# Patient Record
Sex: FEMALE | Race: OTHER | HISPANIC OR LATINO | Employment: UNEMPLOYED | URBAN - METROPOLITAN AREA
[De-identification: names, ages, dates, MRNs, and addresses within clinical notes are randomized per-mention and may not be internally consistent; named-entity substitution may affect disease eponyms.]

---

## 2021-07-25 ENCOUNTER — HOSPITAL ENCOUNTER (EMERGENCY)
Facility: HOSPITAL | Age: 46
Discharge: HOME | End: 2021-07-25
Attending: EMERGENCY MEDICINE | Admitting: EMERGENCY MEDICINE
Payer: COMMERCIAL

## 2021-07-25 ENCOUNTER — APPOINTMENT (EMERGENCY)
Dept: RADIOLOGY | Facility: HOSPITAL | Age: 46
End: 2021-07-25
Payer: COMMERCIAL

## 2021-07-25 VITALS
OXYGEN SATURATION: 100 % | SYSTOLIC BLOOD PRESSURE: 113 MMHG | WEIGHT: 140.65 LBS | TEMPERATURE: 98.1 F | RESPIRATION RATE: 16 BRPM | DIASTOLIC BLOOD PRESSURE: 75 MMHG | HEART RATE: 60 BPM

## 2021-07-25 DIAGNOSIS — U07.1 LAB TEST POSITIVE FOR DETECTION OF COVID-19 VIRUS: ICD-10-CM

## 2021-07-25 DIAGNOSIS — N93.8 DYSFUNCTIONAL UTERINE BLEEDING: Primary | ICD-10-CM

## 2021-07-25 DIAGNOSIS — D64.9 ANEMIA: ICD-10-CM

## 2021-07-25 LAB
ABO GROUP BLD: NORMAL
ABO GROUP BLD: NORMAL
ANION GAP SERPL CALCULATED.3IONS-SCNC: 9 MMOL/L (ref 4–13)
ANISOCYTOSIS BLD QL SMEAR: PRESENT
BACTERIA UR QL AUTO: ABNORMAL /HPF
BASOPHILS # BLD MANUAL: 0 THOUSAND/UL (ref 0–0.1)
BASOPHILS NFR MAR MANUAL: 0 % (ref 0–1)
BILIRUB UR QL STRIP: NEGATIVE
BLD GP AB SCN SERPL QL: NEGATIVE
BUN SERPL-MCNC: 10 MG/DL (ref 5–25)
CALCIUM SERPL-MCNC: 8.1 MG/DL (ref 8.3–10.1)
CHLORIDE SERPL-SCNC: 103 MMOL/L (ref 100–108)
CLARITY UR: CLEAR
CO2 SERPL-SCNC: 26 MMOL/L (ref 21–32)
COLOR UR: ABNORMAL
CREAT SERPL-MCNC: 0.82 MG/DL (ref 0.6–1.3)
EOSINOPHIL # BLD MANUAL: 0.17 THOUSAND/UL (ref 0–0.4)
EOSINOPHIL NFR BLD MANUAL: 4 % (ref 0–6)
ERYTHROCYTE [DISTWIDTH] IN BLOOD BY AUTOMATED COUNT: 20.5 % (ref 11.6–15.1)
EXT PREG TEST URINE: NORMAL
EXT. CONTROL ED NAV: NORMAL
GFR SERPL CREATININE-BSD FRML MDRD: 86 ML/MIN/1.73SQ M
GLUCOSE SERPL-MCNC: 86 MG/DL (ref 65–140)
GLUCOSE UR STRIP-MCNC: NEGATIVE MG/DL
HCT VFR BLD AUTO: 25.1 % (ref 34.8–46.1)
HGB BLD-MCNC: 6.9 G/DL (ref 11.5–15.4)
HGB UR QL STRIP.AUTO: ABNORMAL
HYPERCHROMIA BLD QL SMEAR: PRESENT
KETONES UR STRIP-MCNC: NEGATIVE MG/DL
LEUKOCYTE ESTERASE UR QL STRIP: NEGATIVE
LYMPHOCYTES # BLD AUTO: 1.1 THOUSAND/UL (ref 0.6–4.47)
LYMPHOCYTES # BLD AUTO: 26 % (ref 14–44)
MCH RBC QN AUTO: 17.4 PG (ref 26.8–34.3)
MCHC RBC AUTO-ENTMCNC: 27.5 G/DL (ref 31.4–37.4)
MCV RBC AUTO: 63 FL (ref 82–98)
MONOCYTES # BLD AUTO: 0.21 THOUSAND/UL (ref 0–1.22)
MONOCYTES NFR BLD: 5 % (ref 4–12)
NEUTROPHILS # BLD MANUAL: 2.76 THOUSAND/UL (ref 1.85–7.62)
NEUTS BAND NFR BLD MANUAL: 2 % (ref 0–8)
NEUTS SEG NFR BLD AUTO: 63 % (ref 43–75)
NITRITE UR QL STRIP: NEGATIVE
NON-SQ EPI CELLS URNS QL MICRO: ABNORMAL /HPF
NRBC BLD AUTO-RTO: 0 /100 WBCS
PH UR STRIP.AUTO: 5.5 [PH] (ref 4.5–8)
PLATELET # BLD AUTO: 319 THOUSANDS/UL (ref 149–390)
PLATELET BLD QL SMEAR: ADEQUATE
PMV BLD AUTO: 10.6 FL (ref 8.9–12.7)
POTASSIUM SERPL-SCNC: 4.6 MMOL/L (ref 3.5–5.3)
PROT UR STRIP-MCNC: NEGATIVE MG/DL
RBC # BLD AUTO: 3.97 MILLION/UL (ref 3.81–5.12)
RBC #/AREA URNS AUTO: ABNORMAL /HPF
RH BLD: POSITIVE
RH BLD: POSITIVE
SARS-COV-2 RNA RESP QL NAA+PROBE: POSITIVE
SODIUM SERPL-SCNC: 138 MMOL/L (ref 136–145)
SP GR UR STRIP.AUTO: >=1.03 (ref 1–1.03)
SPECIMEN EXPIRATION DATE: NORMAL
TOTAL CELLS COUNTED SPEC: 100
TSH SERPL DL<=0.05 MIU/L-ACNC: 1.22 UIU/ML (ref 0.36–3.74)
UROBILINOGEN UR QL STRIP.AUTO: 0.2 E.U./DL
WBC # BLD AUTO: 4.24 THOUSAND/UL (ref 4.31–10.16)
WBC #/AREA URNS AUTO: ABNORMAL /HPF

## 2021-07-25 PROCEDURE — 96375 TX/PRO/DX INJ NEW DRUG ADDON: CPT

## 2021-07-25 PROCEDURE — 99285 EMERGENCY DEPT VISIT HI MDM: CPT

## 2021-07-25 PROCEDURE — 96361 HYDRATE IV INFUSION ADD-ON: CPT

## 2021-07-25 PROCEDURE — 81025 URINE PREGNANCY TEST: CPT | Performed by: EMERGENCY MEDICINE

## 2021-07-25 PROCEDURE — U0005 INFEC AGEN DETEC AMPLI PROBE: HCPCS | Performed by: EMERGENCY MEDICINE

## 2021-07-25 PROCEDURE — 36430 TRANSFUSION BLD/BLD COMPNT: CPT

## 2021-07-25 PROCEDURE — 86850 RBC ANTIBODY SCREEN: CPT | Performed by: EMERGENCY MEDICINE

## 2021-07-25 PROCEDURE — 84443 ASSAY THYROID STIM HORMONE: CPT | Performed by: EMERGENCY MEDICINE

## 2021-07-25 PROCEDURE — 85025 COMPLETE CBC W/AUTO DIFF WBC: CPT | Performed by: EMERGENCY MEDICINE

## 2021-07-25 PROCEDURE — 85007 BL SMEAR W/DIFF WBC COUNT: CPT | Performed by: EMERGENCY MEDICINE

## 2021-07-25 PROCEDURE — U0003 INFECTIOUS AGENT DETECTION BY NUCLEIC ACID (DNA OR RNA); SEVERE ACUTE RESPIRATORY SYNDROME CORONAVIRUS 2 (SARS-COV-2) (CORONAVIRUS DISEASE [COVID-19]), AMPLIFIED PROBE TECHNIQUE, MAKING USE OF HIGH THROUGHPUT TECHNOLOGIES AS DESCRIBED BY CMS-2020-01-R: HCPCS | Performed by: EMERGENCY MEDICINE

## 2021-07-25 PROCEDURE — 86920 COMPATIBILITY TEST SPIN: CPT

## 2021-07-25 PROCEDURE — 86901 BLOOD TYPING SEROLOGIC RH(D): CPT | Performed by: EMERGENCY MEDICINE

## 2021-07-25 PROCEDURE — 99285 EMERGENCY DEPT VISIT HI MDM: CPT | Performed by: EMERGENCY MEDICINE

## 2021-07-25 PROCEDURE — 36415 COLL VENOUS BLD VENIPUNCTURE: CPT | Performed by: EMERGENCY MEDICINE

## 2021-07-25 PROCEDURE — 80048 BASIC METABOLIC PNL TOTAL CA: CPT | Performed by: EMERGENCY MEDICINE

## 2021-07-25 PROCEDURE — 85027 COMPLETE CBC AUTOMATED: CPT | Performed by: EMERGENCY MEDICINE

## 2021-07-25 PROCEDURE — 86900 BLOOD TYPING SEROLOGIC ABO: CPT | Performed by: EMERGENCY MEDICINE

## 2021-07-25 PROCEDURE — P9016 RBC LEUKOCYTES REDUCED: HCPCS

## 2021-07-25 PROCEDURE — 71045 X-RAY EXAM CHEST 1 VIEW: CPT

## 2021-07-25 PROCEDURE — 96374 THER/PROPH/DIAG INJ IV PUSH: CPT

## 2021-07-25 PROCEDURE — 81001 URINALYSIS AUTO W/SCOPE: CPT

## 2021-07-25 RX ORDER — METOCLOPRAMIDE HYDROCHLORIDE 5 MG/ML
10 INJECTION INTRAMUSCULAR; INTRAVENOUS ONCE
Status: COMPLETED | OUTPATIENT
Start: 2021-07-25 | End: 2021-07-25

## 2021-07-25 RX ORDER — KETOROLAC TROMETHAMINE 30 MG/ML
15 INJECTION, SOLUTION INTRAMUSCULAR; INTRAVENOUS ONCE
Status: COMPLETED | OUTPATIENT
Start: 2021-07-25 | End: 2021-07-25

## 2021-07-25 RX ORDER — MEDROXYPROGESTERONE ACETATE 5 MG/1
10 TABLET ORAL DAILY
Qty: 18 TABLET | Refills: 0 | Status: SHIPPED | OUTPATIENT
Start: 2021-07-26 | End: 2021-08-04

## 2021-07-25 RX ORDER — MEDROXYPROGESTERONE ACETATE 5 MG/1
10 TABLET ORAL ONCE
Status: COMPLETED | OUTPATIENT
Start: 2021-07-25 | End: 2021-07-25

## 2021-07-25 RX ADMIN — MEDROXYPROGESTERONE ACETATE 10 MG: 5 TABLET ORAL at 18:41

## 2021-07-25 RX ADMIN — KETOROLAC TROMETHAMINE 15 MG: 30 INJECTION, SOLUTION INTRAMUSCULAR; INTRAVENOUS at 17:31

## 2021-07-25 RX ADMIN — SODIUM CHLORIDE 1000 ML: 0.9 INJECTION, SOLUTION INTRAVENOUS at 17:32

## 2021-07-25 RX ADMIN — METOCLOPRAMIDE 10 MG: 5 INJECTION, SOLUTION INTRAMUSCULAR; INTRAVENOUS at 17:31

## 2021-07-25 NOTE — ED PROVIDER NOTES
History  Chief Complaint   Patient presents with    Weakness - Generalized     Generalized weakness, decreased appetite since monday  also reports dizziness  55 y o  F w/h/o anemia p/w cough x 6 days  Dry cough  Also having chills, fatigue, decreased appetite, lightheadedness, and frontal HA  Pt has been doing a lot of traveling and around a lot of people  History provided by:  Patient   used: Yes (Family)    Fatigue  Duration:  6 days  Progression:  Unchanged  Chronicity:  New  Relieved by:  None tried  Worsened by:  Nothing  Ineffective treatments:  None tried  Associated symptoms: cough and headaches    Associated symptoms: no abdominal pain, no diarrhea, no dysuria, no fever, no frequency, no myalgias, no nausea and no vomiting        None       Past Medical History:   Diagnosis Date    Anemia        History reviewed  No pertinent surgical history  History reviewed  No pertinent family history  I have reviewed and agree with the history as documented  E-Cigarette/Vaping     E-Cigarette/Vaping Substances     Social History     Tobacco Use    Smoking status: Never Smoker    Smokeless tobacco: Never Used   Substance Use Topics    Alcohol use: Never    Drug use: Never       Review of Systems   Constitutional: Positive for appetite change, chills and fatigue  Negative for fever  HENT: Negative for rhinorrhea and sore throat  Respiratory: Positive for cough  Gastrointestinal: Negative for abdominal pain, diarrhea, nausea and vomiting  Genitourinary: Negative for dysuria and frequency  Musculoskeletal: Negative for myalgias  Neurological: Positive for light-headedness and headaches  All other systems reviewed and are negative  Physical Exam  Physical Exam  Vitals and nursing note reviewed  Constitutional:       General: She is not in acute distress  Appearance: She is well-developed  She is not ill-appearing, toxic-appearing or diaphoretic  HENT:      Head: Normocephalic and atraumatic  Right Ear: Tympanic membrane normal       Left Ear: Tympanic membrane normal    Eyes:      Extraocular Movements: Extraocular movements intact  Conjunctiva/sclera: Conjunctivae normal       Pupils: Pupils are equal, round, and reactive to light  Neck:      Meningeal: Brudzinski's sign and Kernig's sign absent  Cardiovascular:      Rate and Rhythm: Normal rate and regular rhythm  Heart sounds: Normal heart sounds  No murmur heard  No friction rub  Pulmonary:      Effort: Pulmonary effort is normal  No accessory muscle usage or respiratory distress  Breath sounds: Normal breath sounds  No stridor  No wheezing, rhonchi or rales  Abdominal:      General: There is no distension  Palpations: Abdomen is soft  Abdomen is not rigid  Tenderness: There is no abdominal tenderness  There is no guarding or rebound  Musculoskeletal:      Cervical back: Full passive range of motion without pain, normal range of motion and neck supple  No rigidity  Lymphadenopathy:      Cervical: No cervical adenopathy  Skin:     General: Skin is warm and dry  Coloration: Skin is not pale  Findings: No ecchymosis, petechiae or rash  Neurological:      Mental Status: She is alert  GCS: GCS eye subscore is 4  GCS verbal subscore is 5  GCS motor subscore is 6  Cranial Nerves: No cranial nerve deficit  Sensory: No sensory deficit  Motor: Motor function is intact        Gait: Gait normal    Psychiatric:         Behavior: Behavior normal          Vital Signs  ED Triage Vitals   Temperature Pulse Respirations Blood Pressure SpO2   07/25/21 1551 07/25/21 1551 07/25/21 1551 07/25/21 1551 07/25/21 1551   98 °F (36 7 °C) 80 16 108/71 100 %      Temp Source Heart Rate Source Patient Position - Orthostatic VS BP Location FiO2 (%)   07/25/21 1551 07/25/21 1551 07/25/21 1551 07/25/21 1551 --   Oral Monitor Sitting Right arm       Pain Score 07/25/21 1731       No Pain           Vitals:    07/25/21 2025 07/25/21 2100 07/25/21 2130 07/25/21 2208   BP: 112/74 117/74 123/76 113/75   Pulse: 73 66 68 60   Patient Position - Orthostatic VS:  Lying Lying          Visual Acuity  Visual Acuity      Most Recent Value   L Pupil Size (mm)  3   R Pupil Size (mm)  3          ED Medications  Medications   sodium chloride 0 9 % bolus 1,000 mL (0 mL Intravenous Stopped 7/25/21 1922)   metoclopramide (REGLAN) injection 10 mg (10 mg Intravenous Given 7/25/21 1731)   ketorolac (TORADOL) injection 15 mg (15 mg Intravenous Given 7/25/21 1731)   medroxyPROGESTERone (PROVERA) tablet 10 mg (10 mg Oral Given 7/25/21 1841)       Diagnostic Studies  Results Reviewed     Procedure Component Value Units Date/Time    Novel Coronavirus (Covid-19),PCR [facility] - 24 Hour Routine [[id number]]  (Abnormal) Collected: 07/25/21 1726    Lab Status: Final result Specimen: Nares from Nose Updated: 07/25/21 1817     SARS-CoV-2 Positive    Narrative: The specimen collection materials, transport medium, and/or testing methodology utilized in the production of these test results have been proven to be reliable in a limited validation with an abbreviated program under the Emergency Utilization Authorization provided by the FDA  Testing reported as "Presumptive positive" will be confirmed with secondary testing to ensure result accuracy  Clinical caution and judgement should be used with the interpretation of these results with consideration of the clinical impression and other laboratory testing  Testing reported as "Positive" or "Negative" has been proven to be accurate according to standard laboratory validation requirements  All testing is performed with control materials showing appropriate reactivity at standard intervals        Manual Differential(PHLEBS Do Not Order) [[id number]] Collected: 07/25/21 1731    Lab Status: Final result Specimen: Blood from Arm, Right Updated: 07/25/21 1806     Segmented % 63 %      Bands % 2 %      Lymphocytes % 26 %      Monocytes % 5 %      Eosinophils, % 4 %      Basophils % 0 %      Absolute Neutrophils 2 76 Thousand/uL      Lymphocytes Absolute 1 10 Thousand/uL      Monocytes Absolute 0 21 Thousand/uL      Eosinophils Absolute 0 17 Thousand/uL      Basophils Absolute 0 00 Thousand/uL      Total Counted 100     Anisocytosis Present     Hypochromia Present     Platelet Estimate Adequate    Narrative:      WBC has been corrected due to the presence of NRBC, please see adjusted WBC     Basic metabolic panel [[id number]]  (Abnormal) Collected: 07/25/21 1731    Lab Status: Final result Specimen: Blood from Arm, Right Updated: 07/25/21 1805     Sodium 138 mmol/L      Potassium 4 6 mmol/L      Chloride 103 mmol/L      CO2 26 mmol/L      ANION GAP 9 mmol/L      BUN 10 mg/dL      Creatinine 0 82 mg/dL      Glucose 86 mg/dL      Calcium 8 1 mg/dL      eGFR 86 ml/min/1 73sq m     Narrative:      [facility] guidelines for Chronic Kidney Disease (CKD):     Stage 1 with normal or high GFR (GFR > 90 mL/min/1 73 square meters)    Stage 2 Mild CKD (GFR = 60-89 mL/min/1 73 square meters)    Stage 3A Moderate CKD (GFR = 45-59 mL/min/1 73 square meters)    Stage 3B Moderate CKD (GFR = 30-44 mL/min/1 73 square meters)    Stage 4 Severe CKD (GFR = 15-29 mL/min/1 73 square meters)    Stage 5 End Stage CKD (GFR <15 mL/min/1 73 square meters)  Note: GFR calculation is accurate only with a steady state creatinine    TSH [[id number]]  (Normal) Collected: 07/25/21 1731    Lab Status: Final result Specimen: Blood from Arm, Right Updated: 07/25/21 1805     TSH 3RD GENERATON 1 215 uIU/mL     Narrative:      Patients undergoing fluorescein dye angiography may retain small amounts of fluorescein in the body for 48-72 hours post procedure  Samples containing fluorescein can produce falsely depressed TSH values   If the patient had this procedure,a specimen should be resubmitted post fluorescein clearance  Urine Microscopic [[id number]]  (Abnormal) Collected: 07/25/21 1729    Lab Status: Final result Specimen: Urine, Clean Catch Updated: 07/25/21 1801     RBC, UA 10-20 /hpf      WBC, UA None Seen /hpf      Epithelial Cells Occasional /hpf      Bacteria, UA Occasional /hpf     CBC and differential [[id number]]  (Abnormal) Collected: 07/25/21 1731    Lab Status: Final result Specimen: Blood from Arm, Right Updated: 07/25/21 1747     WBC 4 24 Thousand/uL      RBC 3 97 Million/uL      Hemoglobin 6 9 g/dL      Hematocrit 25 1 %      MCV 63 fL      MCH 17 4 pg      MCHC 27 5 g/dL      RDW 20 5 %      MPV 10 6 fL      Platelets 663 Thousands/uL      nRBC 0 /100 WBCs     Narrative: This is an appended report  These results have been appended to a previously verified report      POCT pregnancy, urine [[id number]]  (Normal) Resulted: 07/25/21 1736    Lab Status: Final result Updated: 07/25/21 1736     EXT PREG TEST UR (Ref: Negative) NEG(-)     Control VALID    Urine Macroscopic, POC [[id number]]  (Abnormal) Collected: 07/25/21 1729    Lab Status: Final result Specimen: Urine Updated: 07/25/21 1730     Color, UA Orange     Clarity, UA Clear     pH, UA 5 5     Leukocytes, UA Negative     Nitrite, UA Negative     Protein, UA Negative mg/dl      Glucose, UA Negative mg/dl      Ketones, UA Negative mg/dl      Urobilinogen, UA 0 2 E U /dl      Bilirubin, UA Negative     Blood, UA Moderate     Specific Gravity, UA >=1 030    Narrative:      CLINITEK RESULT                 XR chest portable   ED Interpretation by DO Calvin (07/25 1726)   No acute abnormalities                 Procedures  CriticalCare Time  Performed by: DO Calvin  Authorized by: DO Calvin     Critical care provider statement:     Critical care time (minutes):  45    Critical care time was exclusive of:  Separately billable procedures and treating other patients and teaching time    Critical care was necessary to treat or prevent imminent or life-threatening deterioration of the following conditions:  Circulatory failure    Critical care was time spent personally by me on the following activities:  Blood draw for specimens, obtaining history from patient or surrogate, development of treatment plan with patient or surrogate, evaluation of patient's response to treatment, examination of patient, ordering and performing treatments and interventions, ordering and review of laboratory studies, ordering and review of radiographic studies and re-evaluation of patient's condition    I assumed direction of critical care for this patient from another provider in my specialty: no    Comments:      Pt with anemia requiring transfusion             ED Course  ED Course as of Jul 25 2237   Sun Jul 25, 2021   1731 Pt given Toradol/Reglan      1746 Informed pt of low Hg  Pt states she has a h/o low hg but has never had a transfusion  She states she believes it's from heavy periods for which she has f/u with OBGYN in [place] 2 days for  Pt started her period yesterday  Pt isn't sure if she wants a blood transfusion and wants to discuss with her  before she decides  Hemoglobin(!!): 6 9 1814 Pt now agreeable to transfusion  Will transfuse one unit and start medroprogesterone 10mg  1859 SARS-COV-2(!): Positive   1928 Updated pt on remaining labs and positive COVID swab  Instructed pt to self-quarantine at home for the next 10 days  2012 Transfusion started                                SBIRT 22yo+      Most Recent Value   SBIRT (23 yo +)   In order to provide better care to our patients, we are screening all of our patients for alcohol and drug use  Would it be okay to ask you these screening questions?   No Filed at: 07/25/2021 2019                    [facility]    Disposition  Final diagnoses:   Dysfunctional uterine bleeding   Anemia   Lab test positive for detection of COVID-19 virus     Time reflects when diagnosis was documented in both MDM as applicable and the Disposition within this note     Time User Action Codes Description Comment    7/25/2021  6:16 PM Bora Puri 48 [N93 8] Dysfunctional uterine bleeding     7/25/2021  6:59 PM Bora Puri 48 [D64 9] Anemia     7/25/2021  6:59 PM Yamini Puri Add [U07 1] Lab test positive for detection of COVID-19 virus       ED Disposition     ED Disposition Condition Date/Time Comment    Discharge Stable Sun Jul 25, 2021  8:19 PM Osbaldo Mittal discharge to home/self care  Follow-up Information     Follow up With Specialties Details Why Contact Info Additional [address] [facility] Emergency Department Emergency Medicine Go to  If symptoms worsen or if bleeding worsens [place] [phone]  112 [facility] Emergency Department, [address] [place] [address], [id number]          Discharge Medication List as of 7/25/2021  8:21 PM      START taking these medications    Details   medroxyPROGESTERone (PROVERA) 5 mg tablet Take 2 tablets (10 mg total) by mouth daily for 9 days, Starting Mon 7/26/2021, Until Wed 8/4/2021, Print           No discharge procedures on file      PDMP Review     None          ED Provider  Electronically Signed by           Marlin Alejandro 24, DO  07/25/21 0803

## 2021-07-26 LAB
ABO GROUP BLD BPU: NORMAL
BPU ID: NORMAL
CROSSMATCH: NORMAL
UNIT DISPENSE STATUS: NORMAL
UNIT PRODUCT CODE: NORMAL
UNIT RH: NORMAL

## 2023-12-13 ENCOUNTER — OFFICE VISIT (OUTPATIENT)
Dept: FAMILY MEDICINE CLINIC | Facility: CLINIC | Age: 48
End: 2023-12-13

## 2023-12-13 VITALS
SYSTOLIC BLOOD PRESSURE: 112 MMHG | OXYGEN SATURATION: 99 % | HEART RATE: 102 BPM | HEIGHT: 64 IN | TEMPERATURE: 97.5 F | DIASTOLIC BLOOD PRESSURE: 64 MMHG | BODY MASS INDEX: 25.03 KG/M2 | WEIGHT: 146.6 LBS

## 2023-12-13 DIAGNOSIS — N92.0 MENORRHAGIA WITH REGULAR CYCLE: ICD-10-CM

## 2023-12-13 DIAGNOSIS — Z11.59 NEED FOR HEPATITIS C SCREENING TEST: ICD-10-CM

## 2023-12-13 DIAGNOSIS — Z12.31 ENCOUNTER FOR SCREENING MAMMOGRAM FOR BREAST CANCER: ICD-10-CM

## 2023-12-13 DIAGNOSIS — Z11.4 SCREENING FOR HIV (HUMAN IMMUNODEFICIENCY VIRUS): ICD-10-CM

## 2023-12-13 DIAGNOSIS — Z12.11 SCREENING FOR MALIGNANT NEOPLASM OF COLON: ICD-10-CM

## 2023-12-13 DIAGNOSIS — Z00.00 ANNUAL PHYSICAL EXAM: Primary | ICD-10-CM

## 2023-12-13 DIAGNOSIS — Z13.6 SCREENING FOR CARDIOVASCULAR CONDITION: ICD-10-CM

## 2023-12-13 DIAGNOSIS — Z12.4 SCREENING FOR CERVICAL CANCER: ICD-10-CM

## 2023-12-13 DIAGNOSIS — Z13.1 SCREENING FOR DIABETES MELLITUS: ICD-10-CM

## 2023-12-13 PROBLEM — D53.9 ANEMIA ASSOCIATED WITH NUTRITIONAL DEFICIENCY: Status: ACTIVE | Noted: 2023-12-13

## 2023-12-13 NOTE — PATIENT INSTRUCTIONS
Wellness Visit for Adults   AMBULATORY CARE:   A wellness visit  is when you see your healthcare provider to get screened for health problems. Your healthcare provider will also give you advice on how to stay healthy. Write down your questions so you remember to ask them. Ask your healthcare provider how often you should have a wellness visit. What happens at a wellness visit:  Your healthcare provider will ask about your health, and your family history of health problems. This includes high blood pressure, heart disease, and cancer. He or she will ask if you have symptoms that concern you, if you smoke, and about your mood. You may also be asked about your intake of medicines, supplements, food, and alcohol. Any of the following may be done: Your weight  will be checked. Your height may also be checked so your body mass index (BMI) can be calculated. Your BMI shows if you are at a healthy weight. Your blood pressure  and heart rate will be checked. Your temperature may also be checked. Blood and urine tests  may be done. Blood tests may be done to check your cholesterol levels. Abnormal cholesterol levels increase your risk for heart disease and stroke. You may also need a blood or urine test to check for diabetes if you are at increased risk. Urine tests may be done to look for signs of an infection or kidney disease. A physical exam  includes checking your heartbeat and lungs with a stethoscope. Your healthcare provider may also check your skin to look for sun damage. Screening tests  may be recommended. A screening test is done to check for diseases that may not cause symptoms. The screening tests you may need depend on your age, gender, family history, and lifestyle habits. For example, colorectal screening may be recommended if you are 48years old or older. Screening tests you need if you are a woman:   A Pap smear  is used to screen for cervical cancer.  Pap smears are usually done every 3 to 5 years depending on your age. You may need them more often if you have had abnormal Pap smear test results in the past. Ask your healthcare provider how often you should have a Pap smear. A mammogram  is an x-ray of your breasts to screen for breast cancer. Experts recommend mammograms every 2 years starting at age 48 years. You may need a mammogram at age 52 years or younger if you have an increased risk for breast cancer. Talk to your healthcare provider about when you should start having mammograms and how often you need them. Vaccines you may need:   Get an influenza vaccine  every year. The influenza vaccine protects you from the flu. Several types of viruses cause the flu. The viruses change over time, so new vaccines are made each year. Get a tetanus-diphtheria (Td) booster vaccine  every 10 years. This vaccine protects you against tetanus and diphtheria. Tetanus is a severe infection that may cause painful muscle spasms and lockjaw. Diphtheria is a severe bacterial infection that causes a thick covering in the back of your mouth and throat. Get a human papillomavirus (HPV) vaccine  if you are female and aged 23 to 32 or male 23 to 24 and never received it. This vaccine protects you from HPV infection. HPV is the most common infection spread by sexual contact. HPV may also cause vaginal, penile, and anal cancers. Get a pneumococcal vaccine  if you are aged 72 years or older. The pneumococcal vaccine is an injection given to protect you from pneumococcal disease. Pneumococcal disease is an infection caused by pneumococcal bacteria. The infection may cause pneumonia, meningitis, or an ear infection. Get a shingles vaccine  if you are 60 or older, even if you have had shingles before. The shingles vaccine is an injection to protect you from the varicella-zoster virus. This is the same virus that causes chickenpox.  Shingles is a painful rash that develops in people who had chickenpox or have been exposed to the virus. How to eat healthy:  My Plate is a model for planning healthy meals. It shows the types and amounts of foods that should go on your plate. Fruits and vegetables make up about half of your plate, and grains and protein make up the other half. A serving of dairy is included on the side of your plate. The amount of calories and serving sizes you need depends on your age, gender, weight, and height. Examples of healthy foods are listed below:  Eat a variety of vegetables  such as dark green, red, and orange vegetables. You can also include canned vegetables low in sodium (salt) and frozen vegetables without added butter or sauces. Eat a variety of fresh fruits , canned fruit in 100% juice, frozen fruit, and dried fruit. Include whole grains. At least half of the grains you eat should be whole grains. Examples include whole-wheat bread, wheat pasta, brown rice, and whole-grain cereals such as oatmeal.    Eat a variety of protein foods such as seafood (fish and shellfish), lean meat, and poultry without skin (turkey and chicken). Examples of lean meats include pork leg, shoulder, or tenderloin, and beef round, sirloin, tenderloin, and extra lean ground beef. Other protein foods include eggs and egg substitutes, beans, peas, soy products, nuts, and seeds. Choose low-fat dairy products such as skim or 1% milk or low-fat yogurt, cheese, and cottage cheese. Limit unhealthy fats  such as butter, hard margarine, and shortening. Exercise:  Exercise at least 30 minutes per day on most days of the week. Some examples of exercise include walking, biking, dancing, and swimming. You can also fit in more physical activity by taking the stairs instead of the elevator or parking farther away from stores. Include muscle strengthening activities 2 days each week. Regular exercise provides many health benefits.  It helps you manage your weight, and decreases your risk for type 2 diabetes, heart disease, stroke, and high blood pressure. Exercise can also help improve your mood. Ask your healthcare provider about the best exercise plan for you. General health and safety guidelines:   Do not smoke. Nicotine and other chemicals in cigarettes and cigars can cause lung damage. Ask your healthcare provider for information if you currently smoke and need help to quit. E-cigarettes or smokeless tobacco still contain nicotine. Talk to your healthcare provider before you use these products. Limit alcohol. A drink of alcohol is 12 ounces of beer, 5 ounces of wine, or 1½ ounces of liquor. Lose weight, if needed. Being overweight increases your risk of certain health conditions. These include heart disease, high blood pressure, type 2 diabetes, and certain types of cancer. Protect your skin. Do not sunbathe or use tanning beds. Use sunscreen with a SPF 15 or higher. Apply sunscreen at least 15 minutes before you go outside. Reapply sunscreen every 2 hours. Wear protective clothing, hats, and sunglasses when you are outside. Drive safely. Always wear your seatbelt. Make sure everyone in your car wears a seatbelt. A seatbelt can save your life if you are in an accident. Do not use your cell phone when you are driving. This could distract you and cause an accident. Pull over if you need to make a call or send a text message. Practice safe sex. Use latex condoms if are sexually active and have more than one partner. Your healthcare provider may recommend screening tests for sexually transmitted infections (STIs). Wear helmets, lifejackets, and protective gear. Always wear a helmet when you ride a bike or motorcycle, go skiing, or play sports that could cause a head injury. Wear protective equipment when you play sports. Wear a lifejacket when you are on a boat or doing water sports.     © Copyright [facility] 2023 Information is for End User's use only and may not be sold, redistributed or otherwise used for commercial purposes. The above information is an  only. It is not intended as medical advice for individual conditions or treatments. Talk to your doctor, nurse or pharmacist before following any medical regimen to see if it is safe and effective for you.

## 2023-12-13 NOTE — PROGRESS NOTES
[address] [place] [facility]    NAME: Cesario Arzate  AGE: 50 y.o. SEX: female  : 1975     DATE: 2023     Assessment and Plan:     Problem List Items Addressed This Visit          Other    Menorrhagia with regular cycle     Heavy menstural bleeding   Periods occur monthly   Has previously been to the ED  for this problem and received 1u PRBCs   Refer to OBGYN          Relevant Orders    Ambulatory Referral to Obstetrics / Gynecology     Other Visit Diagnoses       Annual physical exam    -  Primary    Need for hepatitis C screening test        Relevant Orders    Hepatitis C Antibody    Screening for HIV (human immunodeficiency virus)        Relevant Orders    HIV 1/2 AG/AB w Reflex [facility] for 2 yr old and above    Screening for cervical cancer        Encounter for screening mammogram for breast cancer        Relevant Orders    Mammo screening bilateral w 3d & cad    Screening for diabetes mellitus        Relevant Orders    Comprehensive metabolic panel    Screening for cardiovascular condition        Relevant Orders    Lipid panel    BMI 25.0-25.9,adult        Relevant Orders    CBC and Platelet    Comprehensive metabolic panel    TSH, 3rd generation with Free T4 reflex    Screening for malignant neoplasm of colon        Relevant Orders    Cologuard            Immunizations and preventive care screenings were discussed with patient today. Appropriate education was printed on patient's after visit summary. Counseling:  Alcohol/drug use: discussed moderation in alcohol intake, the recommendations for healthy alcohol use, and avoidance of illicit drug use. Dental Health: discussed importance of regular tooth brushing, flossing, and dental visits. Injury prevention: discussed safety/seat belts, safety helmets, smoke detectors, carbon dioxide detectors, and smoking near bedding or upholstery.   Sexual health: discussed sexually transmitted diseases, partner selection, use of condoms, avoidance of unintended pregnancy, and contraceptive alternatives. Exercise: the importance of regular exercise/physical activity was discussed. Recommend exercise 3-5 times per week for at least 30 minutes. Depression Screening and Follow-up Plan: Patient was screened for depression during today's encounter. They screened negative with a PHQ-2 score of 0. No follow-ups on file. Chief Complaint:     Chief Complaint   Patient presents with    Annual Exam      History of Present Illness:     Adult Annual Physical   Patient here for a comprehensive physical exam. The patient reports no problems. Diet and Physical Activity  Diet/Nutrition: well balanced diet. Exercise: no formal exercise. Depression Screening  PHQ-2/9 Depression Screening    Little interest or pleasure in doing things: 0 - not at all  Feeling down, depressed, or hopeless: 0 - not at all  PHQ-2 Score: 0  PHQ-2 Interpretation: Negative depression screen       General Health  Sleep: sleeps well. Hearing: normal - none . Vision: wears glasses. Dental: regular dental visits, brushes teeth twice daily, and flosses teeth occasionally. /GYN Health  Follows with gynecology? yes   Patient is: perimenopausal       Review of Systems:     Review of Systems   Constitutional:  Negative for activity change, chills, fatigue and fever. HENT:  Negative for congestion, ear pain, rhinorrhea, sore throat and trouble swallowing. Eyes:  Negative for pain and visual disturbance. Respiratory:  Negative for cough, chest tightness and shortness of breath. Cardiovascular:  Negative for chest pain, palpitations and leg swelling. Gastrointestinal:  Negative for abdominal pain, constipation, diarrhea, nausea and vomiting. Genitourinary:  Negative for difficulty urinating, dysuria, hematuria and urgency. Musculoskeletal:  Negative for arthralgias and back pain. Skin:  Negative for color change and rash. Neurological:  Negative for dizziness, seizures, syncope and headaches. Psychiatric/Behavioral:  Negative for dysphoric mood. The patient is not nervous/anxious. All other systems reviewed and are negative. Past Medical History:     Past Medical History:   Diagnosis Date    Anemia       Past Surgical History:     History reviewed. No pertinent surgical history. Social History:     Social History     Socioeconomic History    Marital status: /Civil Union     Spouse name: None    Number of children: None    Years of education: None    Highest education level: None   Occupational History    None   Tobacco Use    Smoking status: Never    Smokeless tobacco: Never   Vaping Use    Vaping status: Never Used   Substance and Sexual Activity    Alcohol use: Never    Drug use: Never    Sexual activity: None   Other Topics Concern    None   Social History Narrative    None     Social Determinants of Health     Financial Resource Strain: Not on file   Food Insecurity: Not on file   Transportation Needs: Not on file   Physical Activity: Not on file   Stress: Not on file   Social Connections: Not on file   Intimate Partner Violence: Not on file   Housing Stability: Not on file      Family History:     History reviewed. No pertinent family history. Current Medications:     Current Outpatient Medications   Medication Sig Dispense Refill    medroxyPROGESTERone (PROVERA) 5 mg tablet Take 2 tablets (10 mg total) by mouth daily for 9 days 18 tablet 0     No current facility-administered medications for this visit. Allergies:     No Known Allergies   Physical Exam:     /64 (BP Location: Left arm, Patient Position: Sitting, Cuff Size: Adult)   Pulse 102   Temp 97.5 °F (36.4 °C)   Ht 5' 3.75" (1.619 m)   Wt 66.5 kg (146 lb 9.6 oz)   SpO2 99%   BMI 25.36 kg/m²     Physical Exam  Vitals and nursing note reviewed.    Constitutional:       General: She is not in acute distress. Appearance: Normal appearance. She is well-developed. HENT:      Head: Normocephalic and atraumatic. Right Ear: Tympanic membrane, ear canal and external ear normal. There is no impacted cerumen. Left Ear: Tympanic membrane, ear canal and external ear normal. There is no impacted cerumen. Nose: Nose normal.      Mouth/Throat:      Mouth: Mucous membranes are moist.   Eyes:      Extraocular Movements: Extraocular movements intact. Conjunctiva/sclera: Conjunctivae normal.      Pupils: Pupils are equal, round, and reactive to light. Cardiovascular:      Rate and Rhythm: Normal rate and regular rhythm. Pulses: Normal pulses. Heart sounds: Normal heart sounds. No murmur heard. Pulmonary:      Effort: Pulmonary effort is normal. No respiratory distress. Breath sounds: Normal breath sounds. Abdominal:      General: Bowel sounds are normal.      Palpations: Abdomen is soft. Tenderness: There is no abdominal tenderness. Musculoskeletal:         General: Normal range of motion. Cervical back: Normal range of motion and neck supple. Right lower leg: No edema. Left lower leg: No edema. Skin:     General: Skin is warm and dry. Capillary Refill: Capillary refill takes less than 2 seconds. Neurological:      General: No focal deficit present. Mental Status: She is alert and oriented to person, place, and time. Mental status is at baseline. Psychiatric:         Mood and Affect: Mood normal.         Behavior: Behavior normal.         Thought Content: Thought content normal.         Judgment: Judgment normal.        Patient Instructions   Wellness Visit for Adults   AMBULATORY CARE:   A wellness visit  is when you see your healthcare provider to get screened for health problems. Your healthcare provider will also give you advice on how to stay healthy. Write down your questions so you remember to ask them.  Ask your healthcare provider how often you should have a wellness visit. What happens at a wellness visit:  Your healthcare provider will ask about your health, and your family history of health problems. This includes high blood pressure, heart disease, and cancer. He or she will ask if you have symptoms that concern you, if you smoke, and about your mood. You may also be asked about your intake of medicines, supplements, food, and alcohol. Any of the following may be done: Your weight  will be checked. Your height may also be checked so your body mass index (BMI) can be calculated. Your BMI shows if you are at a healthy weight. Your blood pressure  and heart rate will be checked. Your temperature may also be checked. Blood and urine tests  may be done. Blood tests may be done to check your cholesterol levels. Abnormal cholesterol levels increase your risk for heart disease and stroke. You may also need a blood or urine test to check for diabetes if you are at increased risk. Urine tests may be done to look for signs of an infection or kidney disease. A physical exam  includes checking your heartbeat and lungs with a stethoscope. Your healthcare provider may also check your skin to look for sun damage. Screening tests  may be recommended. A screening test is done to check for diseases that may not cause symptoms. The screening tests you may need depend on your age, gender, family history, and lifestyle habits. For example, colorectal screening may be recommended if you are 48years old or older. Screening tests you need if you are a woman:   A Pap smear  is used to screen for cervical cancer. Pap smears are usually done every 3 to 5 years depending on your age. You may need them more often if you have had abnormal Pap smear test results in the past. Ask your healthcare provider how often you should have a Pap smear. A mammogram  is an x-ray of your breasts to screen for breast cancer.  Experts recommend mammograms every 2 years starting at age 48 years. You may need a mammogram at age 52 years or younger if you have an increased risk for breast cancer. Talk to your healthcare provider about when you should start having mammograms and how often you need them. Vaccines you may need:   Get an influenza vaccine  every year. The influenza vaccine protects you from the flu. Several types of viruses cause the flu. The viruses change over time, so new vaccines are made each year. Get a tetanus-diphtheria (Td) booster vaccine  every 10 years. This vaccine protects you against tetanus and diphtheria. Tetanus is a severe infection that may cause painful muscle spasms and lockjaw. Diphtheria is a severe bacterial infection that causes a thick covering in the back of your mouth and throat. Get a human papillomavirus (HPV) vaccine  if you are female and aged 23 to 32 or male 23 to 24 and never received it. This vaccine protects you from HPV infection. HPV is the most common infection spread by sexual contact. HPV may also cause vaginal, penile, and anal cancers. Get a pneumococcal vaccine  if you are aged 72 years or older. The pneumococcal vaccine is an injection given to protect you from pneumococcal disease. Pneumococcal disease is an infection caused by pneumococcal bacteria. The infection may cause pneumonia, meningitis, or an ear infection. Get a shingles vaccine  if you are 60 or older, even if you have had shingles before. The shingles vaccine is an injection to protect you from the varicella-zoster virus. This is the same virus that causes chickenpox. Shingles is a painful rash that develops in people who had chickenpox or have been exposed to the virus. How to eat healthy:  My Plate is a model for planning healthy meals. It shows the types and amounts of foods that should go on your plate. Fruits and vegetables make up about half of your plate, and grains and protein make up the other half.  A serving of dairy is included on the side of your plate. The amount of calories and serving sizes you need depends on your age, gender, weight, and height. Examples of healthy foods are listed below:  Eat a variety of vegetables  such as dark green, red, and orange vegetables. You can also include canned vegetables low in sodium (salt) and frozen vegetables without added butter or sauces. Eat a variety of fresh fruits , canned fruit in 100% juice, frozen fruit, and dried fruit. Include whole grains. At least half of the grains you eat should be whole grains. Examples include whole-wheat bread, wheat pasta, brown rice, and whole-grain cereals such as oatmeal.    Eat a variety of protein foods such as seafood (fish and shellfish), lean meat, and poultry without skin (turkey and chicken). Examples of lean meats include pork leg, shoulder, or tenderloin, and beef round, sirloin, tenderloin, and extra lean ground beef. Other protein foods include eggs and egg substitutes, beans, peas, soy products, nuts, and seeds. Choose low-fat dairy products such as skim or 1% milk or low-fat yogurt, cheese, and cottage cheese. Limit unhealthy fats  such as butter, hard margarine, and shortening. Exercise:  Exercise at least 30 minutes per day on most days of the week. Some examples of exercise include walking, biking, dancing, and swimming. You can also fit in more physical activity by taking the stairs instead of the elevator or parking farther away from stores. Include muscle strengthening activities 2 days each week. Regular exercise provides many health benefits. It helps you manage your weight, and decreases your risk for type 2 diabetes, heart disease, stroke, and high blood pressure. Exercise can also help improve your mood. Ask your healthcare provider about the best exercise plan for you. General health and safety guidelines:   Do not smoke. Nicotine and other chemicals in cigarettes and cigars can cause lung damage. Ask your healthcare provider for information if you currently smoke and need help to quit. E-cigarettes or smokeless tobacco still contain nicotine. Talk to your healthcare provider before you use these products. Limit alcohol. A drink of alcohol is 12 ounces of beer, 5 ounces of wine, or 1½ ounces of liquor. Lose weight, if needed. Being overweight increases your risk of certain health conditions. These include heart disease, high blood pressure, type 2 diabetes, and certain types of cancer. Protect your skin. Do not sunbathe or use tanning beds. Use sunscreen with a SPF 15 or higher. Apply sunscreen at least 15 minutes before you go outside. Reapply sunscreen every 2 hours. Wear protective clothing, hats, and sunglasses when you are outside. Drive safely. Always wear your seatbelt. Make sure everyone in your car wears a seatbelt. A seatbelt can save your life if you are in an accident. Do not use your cell phone when you are driving. This could distract you and cause an accident. Pull over if you need to make a call or send a text message. Practice safe sex. Use latex condoms if are sexually active and have more than one partner. Your healthcare provider may recommend screening tests for sexually transmitted infections (STIs). Wear helmets, lifejackets, and protective gear. Always wear a helmet when you ride a bike or motorcycle, go skiing, or play sports that could cause a head injury. Wear protective equipment when you play sports. Wear a lifejacket when you are on a boat or doing water sports. © Copyright Bruno Felipe 2023 Information is for End User's use only and may not be sold, redistributed or otherwise used for commercial purposes. The above information is an  only. It is not intended as medical advice for individual conditions or treatments. Talk to your doctor, nurse or pharmacist before following any medical regimen to see if it is safe and effective for you. WILTON Wadsworth  [address] [place] [address]

## 2023-12-14 PROBLEM — N92.0 MENORRHAGIA WITH REGULAR CYCLE: Status: ACTIVE | Noted: 2023-12-14

## 2023-12-14 NOTE — ASSESSMENT & PLAN NOTE
Heavy menstural bleeding   Periods occur monthly   Has previously been to the ED  for this problem and received 1u PRBCs   Refer to [facility] [place] hepatitis hx hepatitis hx

## 2024-12-24 ENCOUNTER — CONSULT (OUTPATIENT)
Dept: FAMILY MEDICINE CLINIC | Facility: CLINIC | Age: 49
End: 2024-12-24
Payer: MEDICARE

## 2024-12-24 VITALS
OXYGEN SATURATION: 99 % | HEART RATE: 84 BPM | BODY MASS INDEX: 25.3 KG/M2 | WEIGHT: 148.2 LBS | SYSTOLIC BLOOD PRESSURE: 100 MMHG | DIASTOLIC BLOOD PRESSURE: 82 MMHG | TEMPERATURE: 97.6 F | HEIGHT: 64 IN

## 2024-12-24 DIAGNOSIS — Z01.818 PREOP EXAMINATION: ICD-10-CM

## 2024-12-24 DIAGNOSIS — M79.5 FOREIGN BODY (FB) IN SOFT TISSUE: Primary | ICD-10-CM

## 2024-12-24 PROCEDURE — 99243 OFF/OP CNSLTJ NEW/EST LOW 30: CPT

## 2024-12-24 NOTE — PROGRESS NOTES
[facility] PRE-OPERATIVE EVALUATION  [facility] - [facility]    NAME: Rafy Khan  AGE: 49 y.o. SEX: female  : 1975     DATE: 2024    [facility] Pre-Operative Evaluation      Chief Complaint: Pre-operative Evaluation     Surgery: Foreign body removal of right foot   Anticipated Date of Surgery: 1/3/2025  Referring Provider: No ref. provider found       History of Present Illness:     Rafy Khan is a 49 y.o. female who presents to the office today for a preoperative consultation at the request of surgeon, Dr. Mcneil, who plans on performing Removal of foreign body on right toe on 1/3/2025. Planned anesthesia is regional. Patient has a bleeding risk of: no recent abnormal bleeding. Patient does not have objections to receiving blood products if needed.      Assessment of Chronic Conditions:   - None     Assessment of Cardiac Risk:  Denies unstable or severe angina or MI in the last 6 weeks or history of stent placement in the last year   Denies decompensated heart failure (e.g. New onset heart failure, NYHA functional class IV heart failure, or worsening existing heart failure)  Denies significant arrhythmias such as high grade AV block, symptomatic ventricular arrhythmia, newly recognized ventricular tachycardia, supraventricular tachycardia with resting heart rate >100, or symptomatic bradycardia  Denies severe heart valve disease including aortic stenosis or symptomatic mitral stenosis     Exercise Capacity:  Able to walk 4 blocks without symptoms?: Yes  Able to walk 2 flights without symptoms?: Yes    Prior Anesthesia Reactions: Yes, once during  where it gave her itchy. No breathing difficulties noted.      Personal history of venous thromboembolic disease? No    History of steroid use for >2 weeks within last year? No         Review of Systems:     Review of Systems   Constitutional:  Negative for chills and  fever.   HENT:  Negative for ear pain and sore throat.    Eyes:  Negative for pain and visual disturbance.   Respiratory:  Negative for cough and shortness of breath.    Cardiovascular:  Negative for chest pain and palpitations.   Gastrointestinal:  Negative for abdominal pain and vomiting.   Genitourinary:  Negative for dysuria and hematuria.   Musculoskeletal:  Negative for arthralgias and back pain.   Skin:  Negative for color change and rash.   Neurological:  Negative for seizures and syncope.   All other systems reviewed and are negative.      Current Problem List:     Patient Active Problem List   Diagnosis   • Anemia associated with nutritional deficiency   • Menorrhagia with regular cycle   • Foreign body (FB) in soft tissue       Allergies:     No Known Allergies    Current Medications:     No current outpatient medications on file.    Past Medical History:       Past Medical History:   Diagnosis Date   • Anemia         History reviewed. No pertinent surgical history.     History reviewed. No pertinent family history.     Social History     Socioeconomic History   • Marital status: /Civil Union     Spouse name: Not on file   • Number of children: Not on file   • Years of education: Not on file   • Highest education level: Not on file   Occupational History   • Not on file   Tobacco Use   • Smoking status: Never     Passive exposure: Never   • Smokeless tobacco: Never   Vaping Use   • Vaping status: Never Used   Substance and Sexual Activity   • Alcohol use: Never   • Drug use: Never   • Sexual activity: Not on file   Other Topics Concern   • Not on file   Social History Narrative   • Not on file     Social Drivers of Health     Financial Resource Strain: Not on file   Food Insecurity: Not on file   Transportation Needs: Not on file   Physical Activity: Not on file   Stress: Not on file   Social Connections: Not on file   Intimate Partner Violence: Not on file   Housing Stability: Not on file       "  Physical Exam:     /82 (BP Location: Left arm, Patient Position: Sitting, Cuff Size: Adult)   Pulse 84   Temp 97.6 °F (36.4 °C) (Temporal)   Ht 5' 4\" (1.626 m)   Wt 67.2 kg (148 lb 3.2 oz)   SpO2 99%   BMI 25.44 kg/m²     Physical Exam  Constitutional:       Appearance: Normal appearance.   HENT:      Head: Normocephalic and atraumatic.   Eyes:      Extraocular Movements: Extraocular movements intact.      Pupils: Pupils are equal, round, and reactive to light.   Cardiovascular:      Rate and Rhythm: Normal rate and regular rhythm.      Pulses: Normal pulses.      Heart sounds: Normal heart sounds.   Pulmonary:      Effort: Pulmonary effort is normal. No respiratory distress.      Breath sounds: Normal breath sounds. No stridor. No wheezing, rhonchi or rales.   Abdominal:      General: Bowel sounds are normal. There is no distension.      Palpations: Abdomen is soft.      Tenderness: There is no abdominal tenderness.   Skin:     General: Skin is warm.      Capillary Refill: Capillary refill takes less than 2 seconds.   Neurological:      General: No focal deficit present.      Mental Status: She is alert and oriented to person, place, and time.   Psychiatric:         Mood and Affect: Mood normal.         Behavior: Behavior normal.          Data:     Pre-operative work-up    Laboratory Results: I have personally reviewed the pertinent laboratory results/reports      EKG: Results Review Statement: No pertinent imaging studies reviewed.    Chest x-ray: Results Review Statement: No pertinent imaging studies reviewed.      Previous cardiopulmonary studies within the past year:  Echocardiogram: N/A  Cardiac Catheterization: N/A  Stress Test: N/A  Pulmonary Function Testing: N/A      Assessment & Recommendations:     1. Foreign body (FB) in soft tissue        2. Preop examination            Pre-Op Evaluation Assessment  49 y.o. female with planned surgery: Removal of foreign body R Toe.    Known risk factors " for perioperative complications: None.        Current medications which may produce withdrawal symptoms if withheld perioperatively: None.    Pre-Op Evaluation Plan  1. Further preoperative workup as follows:   - None; no further preoperative work-up is required    2. Medication Management/Recommendations:   - Not applicable, not on any medications    3. Prophylaxis for cardiac events with perioperative beta-blockers: not indicated.    4. Patient requires further consultation with: None    Clearance  Patient is CLEARED for surgery without any additional cardiac testing.     Srinivas Head MD  [address] [address]  Phone#  [phone]  Fax#  [phone]      Srinivas Head MD

## 2024-12-26 ENCOUNTER — TELEPHONE (OUTPATIENT)
Age: 49
End: 2024-12-26

## 2024-12-30 ENCOUNTER — OFFICE VISIT (OUTPATIENT)
Dept: FAMILY MEDICINE CLINIC | Facility: CLINIC | Age: 49
End: 2024-12-30
Payer: MEDICARE

## 2024-12-30 VITALS
HEART RATE: 79 BPM | TEMPERATURE: 97.8 F | OXYGEN SATURATION: 99 % | DIASTOLIC BLOOD PRESSURE: 80 MMHG | BODY MASS INDEX: 26.62 KG/M2 | SYSTOLIC BLOOD PRESSURE: 110 MMHG | HEIGHT: 63 IN | WEIGHT: 150.25 LBS

## 2024-12-30 DIAGNOSIS — Z00.00 ANNUAL PHYSICAL EXAM: Primary | ICD-10-CM

## 2024-12-30 DIAGNOSIS — N92.0 MENORRHAGIA WITH REGULAR CYCLE: ICD-10-CM

## 2024-12-30 DIAGNOSIS — Z12.31 ENCOUNTER FOR SCREENING MAMMOGRAM FOR BREAST CANCER: ICD-10-CM

## 2024-12-30 DIAGNOSIS — D50.9 IRON DEFICIENCY ANEMIA, UNSPECIFIED IRON DEFICIENCY ANEMIA TYPE: ICD-10-CM

## 2024-12-30 DIAGNOSIS — Z12.11 SCREENING FOR COLON CANCER: ICD-10-CM

## 2024-12-30 DIAGNOSIS — Z12.4 PAP SMEAR FOR CERVICAL CANCER SCREENING: ICD-10-CM

## 2024-12-30 PROCEDURE — 99396 PREV VISIT EST AGE 40-64: CPT

## 2024-12-30 PROCEDURE — 99214 OFFICE O/P EST MOD 30 MIN: CPT

## 2024-12-30 RX ORDER — FERROUS SULFATE 324(65)MG
324 TABLET, DELAYED RELEASE (ENTERIC COATED) ORAL EVERY OTHER DAY
Qty: 50 TABLET | Refills: 2 | Status: SHIPPED | OUTPATIENT
Start: 2024-12-30 | End: 2025-10-26

## 2024-12-30 RX ORDER — MULTIVIT WITH MINERALS/LUTEIN
250 TABLET ORAL EVERY OTHER DAY
Qty: 50 TABLET | Refills: 2 | Status: SHIPPED | OUTPATIENT
Start: 2024-12-30 | End: 2025-10-26

## 2024-12-30 NOTE — ASSESSMENT & PLAN NOTE
Wt Readings from Last 3 Encounters:   12/30/24 68.2 kg (150 lb 4 oz)   12/24/24 67.2 kg (148 lb 3.2 oz)   12/13/23 66.5 kg (146 lb 9.6 oz)

## 2024-12-30 NOTE — ASSESSMENT & PLAN NOTE
Likely related to menorrhagia  New medications: Ferrous sulfate and vitamin C  Patient educated high iron foods    Orders:    ferrous sulfate 324 (65 Fe) mg; Take 1 tablet (324 mg total) by mouth every other day    ascorbic acid (VITAMIN C) 250 mg tablet; Take 1 tablet (250 mg total) by mouth every other day

## 2024-12-30 NOTE — ASSESSMENT & PLAN NOTE
Heavy menstural bleeding   Periods occur monthly   Has previously been to the ED  for this problem and received 1u PRBCs   Refer to OBGYN

## 2024-12-30 NOTE — PATIENT INSTRUCTIONS
"Patient Education     Good food sources of iron   The Basics   Written by the doctors and editors at [facility]   What is iron? -- Iron is a mineral that your body needs to make \"hemoglobin.\" Hemoglobin is a protein in the blood. It helps red blood cells carry oxygen to all parts of the body.  The amount of iron that you need in your diet depends on your age and sex. Your overall health also plays a role in how much iron you need each day.  Some people do not have enough iron. This is called \"iron deficiency.\" Getting plenty of iron through your diet can help prevent iron deficiency. But if you already have too little iron, eating foods with iron will not be enough to treat it. In this case, your doctor will prescribe extra iron to correct your levels.  What foods are good sources of iron? -- It depends on age:   Babies - It's important to make sure that babies get enough iron, especially if they drink breast milk.   Babies who drink breast milk need extra iron by the time they are 4 months old. This could come from an iron supplement, or solid foods that are high in iron (such as meats or iron-fortified baby cereal).   Babies who do not drink breast milk should drink an \"iron-fortified\" formula. (Formulas labeled \"low-iron\" will not provide enough iron.) Cow's milk and other types of milk do not have the right amount of iron or other nutrients for babies younger than 1 year old.   When a baby starts eating solid foods, include good sources of iron. Examples include meats or iron-fortified baby cereal.   Wait until your baby is at least 1 year old before switching from breast milk or formula to cow's milk or another type of milk.   Children and adults - Eating a healthy, balanced diet will give most people enough iron. Meat is a common source of iron. But if you don't eat meat, you should eat plenty of other foods that are rich in iron. Below are some examples of foods that are considered \"high\" or \"moderate\" in " iron.   High-iron grains - Whole-wheat breads, cereals, and bagels. Flour tortillas, biscuits, English or bran muffins, iron-fortified bran, pretzels, frozen waffles. Hot cereals like oatmeal, cream of wheat, or grits.   Moderate-iron grains - Anca bread, egg noodles, whole-wheat pasta, hamburger and hot dog buns.   Moderate-iron fruits - Dried apricots, dried figs, prune juice.   High-iron vegetables - Spinach, soybeans, canned pumpkin.   Moderate-iron vegetables - Asparagus, [place] sprouts, mushrooms, green peas, white or sweet potato with the skin, tomato sauce, beets, beans such as garbanzo or lima. Greens such as mercedes, turnip, kale, beet, or Swiss chard.   High-iron meats and other proteins - Beef, veal, lamb, pork, beef or chicken liver, clams, sardines, oysters, shrimp, tofu, baked beans with pork, lentils, tahini, tempeh. Beans such as kidney, lima, navy, or white.   Moderate-iron meats and other proteins - Chicken breast, turkey, eggs, fresh or canned tuna or mackerel.   Other high-iron foods - Pumpkin seeds.   Other moderate-iron foods - Molasses, soy milk, seeds such as sesame or sunflower. Nuts such as almonds, pistachios, cashews, and walnuts.  What else should I know? -- Some medicines and foods can change how much iron you absorb from your food. Talk to your doctor, nurse, or dietitian if you have questions.  All topics are updated as new evidence becomes available and our peer review process is complete.  This topic retrieved from [employer] on: Mar 20, 2024.  Topic 087246 Version 2.0  Release: 32.2.4 - C32.78  © 2024 UpToDate, Inc. and/or its affiliates. All rights reserved.  Consumer Information Use and Disclaimer   Disclaimer: This generalized information is a limited summary of diagnosis, treatment, and/or medication information. It is not meant to be comprehensive and should be used as a tool to help the user understand and/or assess potential diagnostic and treatment options. It does NOT  "include all information about conditions, treatments, medications, side effects, or risks that may apply to a specific patient. It is not intended to be medical advice or a substitute for the medical advice, diagnosis, or treatment of a health care provider based on the health care provider's examination and assessment of a patient's specific and unique circumstances. Patients must speak with a health care provider for complete information about their health, medical questions, and treatment options, including any risks or benefits regarding use of medications. This information does not endorse any treatments or medications as safe, effective, or approved for treating a specific patient. UpToDate, Inc. and its affiliates disclaim any warranty or liability relating to this information or the use thereof.The use of this information is governed by the Terms of Use, available at https://www.[employer].DigitalOcean/en/know/clinical-effectiveness-terms. 2024© UpToDate, Inc. and its affiliates and/or licensors. All rights reserved.  Copyright   © 2024 UpToDate, Inc. and/or its affiliates. All rights reserved.  Patient Education     Routine physical for adults   The Basics   Written by the doctors and editors at [employer]   What is a physical? -- A physical is a routine visit, or \"check-up,\" with your doctor. You might also hear it called a \"wellness visit\" or \"preventive visit.\"  During each visit, the doctor will:   Ask about your physical and mental health   Ask about your habits, behaviors, and lifestyle   Do an exam   Give you vaccines if needed   Talk to you about any medicines you take   Give advice about your health   Answer your questions  Getting regular check-ups is an important part of taking care of your health. It can help your doctor find and treat any problems you have. But it's also important for preventing health problems.  A routine physical is different from a \"sick visit.\" A sick visit is when you see a doctor " because of a health concern or problem. Since physicals are scheduled ahead of time, you can think about what you want to ask the doctor.  How often should I get a physical? -- It depends on your age and health. In general, for people age 21 years and older:   If you are younger than 50 years, you might be able to get a physical every 3 years.   If you are 50 years or older, your doctor might recommend a physical every year.  If you have an ongoing health condition, like diabetes or high blood pressure, your doctor will probably want to see you more often.  What happens during a physical? -- In general, each visit will include:   Physical exam - The doctor or nurse will check your height, weight, heart rate, and blood pressure. They will also look at your eyes and ears. They will ask about how you are feeling and whether you have any symptoms that bother you.   Medicines - It's a good idea to bring a list of all the medicines you take to each doctor visit. Your doctor will talk to you about your medicines and answer any questions. Tell them if you are having any side effects that bother you. You should also tell them if you are having trouble paying for any of your medicines.   Habits and behaviors - This includes:   Your diet   Your exercise habits   Whether you smoke, drink alcohol, or use drugs   Whether you are sexually active   Whether you feel safe at home  Your doctor will talk to you about things you can do to improve your health and lower your risk of health problems. They will also offer help and support. For example, if you want to quit smoking, they can give you advice and might prescribe medicines. If you want to improve your diet or get more physical activity, they can help you with this, too.   Lab tests, if needed - The tests you get will depend on your age and situation. For example, your doctor might want to check your:   Cholesterol   Blood sugar   Iron level   Vaccines - The recommended vaccines  "will depend on your age, health, and what vaccines you already had. Vaccines are very important because they can prevent certain serious or deadly infections.   Discussion of screening - \"Screening\" means checking for diseases or other health problems before they cause symptoms. Your doctor can recommend screening based on your age, risk, and preferences. This might include tests to check for:   Cancer, such as breast, prostate, cervical, ovarian, colorectal, prostate, lung, or skin cancer   Sexually transmitted infections, such as chlamydia and gonorrhea   Mental health conditions like depression and anxiety  Your doctor will talk to you about the different types of screening tests. They can help you decide which screenings to have. They can also explain what the results might mean.   Answering questions - The physical is a good time to ask the doctor or nurse questions about your health. If needed, they can refer you to other doctors or specialists, too.  Adults older than 65 years often need other care, too. As you get older, your doctor will talk to you about:   How to prevent falling at home   Hearing or vision tests   Memory testing   How to take your medicines safely   Making sure that you have the help and support you need at home  All topics are updated as new evidence becomes available and our peer review process is complete.  This topic retrieved from [employer] on: May 02, 2024.  Topic 565294 Version 1.0  Release: 32.4.3 - C32.122  © 2024 UpToDate, Inc. and/or its affiliates. All rights reserved.  Consumer Information Use and Disclaimer   Disclaimer: This generalized information is a limited summary of diagnosis, treatment, and/or medication information. It is not meant to be comprehensive and should be used as a tool to help the user understand and/or assess potential diagnostic and treatment options. It does NOT include all information about conditions, treatments, medications, side effects, or risks " that may apply to a specific patient. It is not intended to be medical advice or a substitute for the medical advice, diagnosis, or treatment of a health care provider based on the health care provider's examination and assessment of a patient's specific and unique circumstances. Patients must speak with a health care provider for complete information about their health, medical questions, and treatment options, including any risks or benefits regarding use of medications. This information does not endorse any treatments or medications as safe, effective, or approved for treating a specific patient. UpToDate, Inc. and its affiliates disclaim any warranty or liability relating to this information or the use thereof.The use of this information is governed by the Terms of Use, available at https://www.wolterskluwer.com/en/know/clinical-effectiveness-terms. 2024© UpToDate, Inc. and its affiliates and/or licensors. All rights reserved.  Copyright   © 2024 UpToDate, Inc. and/or its affiliates. All rights reserved.

## 2024-12-30 NOTE — PROGRESS NOTES
Adult Annual Physical  Name: Rafy Khan      : 1975      MRN: [id number]  Encounter Provider: WILTON Murdock  Encounter Date: 2024   Encounter department: [facility]    Assessment & Plan  Annual physical exam         Encounter for screening mammogram for breast cancer    Orders:    Mammo screening bilateral w 3d and cad; Future    Screening for colon cancer    Orders:    Cologuard    Pap smear for cervical cancer screening    Orders:    Ambulatory Referral to Obstetrics / Gynecology; Future    Iron deficiency anemia, unspecified iron deficiency anemia type  Likely related to menorrhagia  New medications: Ferrous sulfate and vitamin C  Patient educated high iron foods    Orders:    ferrous sulfate 324 (65 Fe) mg; Take 1 tablet (324 mg total) by mouth every other day    ascorbic acid (VITAMIN C) 250 mg tablet; Take 1 tablet (250 mg total) by mouth every other day    BMI 26.0-26.9,adult  Wt Readings from Last 3 Encounters:   24 68.2 kg (150 lb 4 oz)   24 67.2 kg (148 lb 3.2 oz)   23 66.5 kg (146 lb 9.6 oz)          Menorrhagia with regular cycle  Heavy menstural bleeding   Periods occur monthly   Has previously been to the ED  for this problem and received 1u PRBCs   Refer to OBGYN          Immunizations and preventive care screenings were discussed with patient today. Appropriate education was printed on patient's after visit summary.    Counseling:  Alcohol/drug use: discussed moderation in alcohol intake, the recommendations for healthy alcohol use, and avoidance of illicit drug use.  Dental Health: discussed importance of regular tooth brushing, flossing, and dental visits.  Injury prevention: discussed safety/seat belts, safety helmets, smoke detectors, carbon monoxide detectors, and smoking near bedding or upholstery.  Sexual health: discussed sexually transmitted diseases, partner selection, use of condoms, avoidance of  unintended pregnancy, and contraceptive alternatives.  Exercise: the importance of regular exercise/physical activity was discussed. Recommend exercise 3-5 times per week for at least 30 minutes.          History of Present Illness     Adult Annual Physical:  Patient presents for annual physical.     Diet and Physical Activity:  - Diet/Nutrition: well balanced diet.  - Exercise: no formal exercise.    General Health:  - Sleep: sleeps well.  - Hearing: normal hearing bilateral ears.  - Vision: vision problems, wears glasses and most recent eye exam > 1 year ago.  - Dental: brushes teeth twice daily and regular dental visits.    /GYN Health:  - Follows with GYN: no.   - History of STDs: no    Advanced Care Planning:  - Has an advanced directive?: no    - Has a durable medical POA?: no    - ACP document given to patient?: no      Review of Systems   Constitutional:  Negative for activity change, chills, fatigue and fever.   HENT:  Negative for congestion, ear pain, rhinorrhea, sore throat and trouble swallowing.    Eyes:  Negative for pain and visual disturbance.   Respiratory:  Negative for cough, chest tightness and shortness of breath.    Cardiovascular:  Negative for chest pain, palpitations and leg swelling.   Gastrointestinal:  Negative for abdominal pain, constipation, diarrhea, nausea and vomiting.   Genitourinary:  Negative for difficulty urinating, dysuria, hematuria and urgency.   Musculoskeletal:  Negative for arthralgias and back pain.   Skin:  Negative for color change and rash.   Neurological:  Negative for dizziness, seizures, syncope and headaches.   Psychiatric/Behavioral:  Negative for dysphoric mood. The patient is not nervous/anxious.    All other systems reviewed and are negative.    Medical History Reviewed by provider this encounter:  Tobacco  Allergies  Meds  Problems  Med Hx  Surg Hx  Fam Hx     .    Objective   /80 (BP Location: Left arm, Patient Position: Sitting, Cuff Size:  "Large)   Pulse 79   Temp 97.8 °F (36.6 °C) (Temporal)   Ht 5' 3.11\" (1.603 m)   Wt 68.2 kg (150 lb 4 oz)   SpO2 99%   BMI 26.52 kg/m²     Physical Exam  Vitals and nursing note reviewed.   Constitutional:       General: She is not in acute distress.     Appearance: Normal appearance. She is well-developed and normal weight.   HENT:      Head: Normocephalic and atraumatic.      Right Ear: Tympanic membrane, ear canal and external ear normal. There is no impacted cerumen.      Left Ear: Tympanic membrane, ear canal and external ear normal. There is no impacted cerumen.      Nose: Nose normal.      Mouth/Throat:      Mouth: Mucous membranes are moist.      Pharynx: Oropharynx is clear.   Eyes:      Extraocular Movements: Extraocular movements intact.      Conjunctiva/sclera: Conjunctivae normal.      Pupils: Pupils are equal, round, and reactive to light.   Cardiovascular:      Rate and Rhythm: Normal rate and regular rhythm.      Pulses: Normal pulses.      Heart sounds: Normal heart sounds. No murmur heard.  Pulmonary:      Effort: Pulmonary effort is normal. No respiratory distress.      Breath sounds: Normal breath sounds.   Abdominal:      General: Bowel sounds are normal.      Palpations: Abdomen is soft.      Tenderness: There is no abdominal tenderness.   Musculoskeletal:         General: Normal range of motion.      Cervical back: Normal range of motion and neck supple.      Right lower leg: No edema.      Left lower leg: No edema.   Skin:     General: Skin is warm and dry.      Capillary Refill: Capillary refill takes less than 2 seconds.   Neurological:      General: No focal deficit present.      Mental Status: She is alert and oriented to person, place, and time. Mental status is at baseline.   Psychiatric:         Mood and Affect: Mood normal.         Behavior: Behavior normal.         Thought Content: Thought content normal.         Judgment: Judgment normal.       Administrative Statements   I " "have spent a total time of 40 minutes in caring for this patient on the day of the visit/encounter including Diagnostic results, Prognosis, Risks and benefits of tx options, Instructions for management, Patient and family education, Importance of tx compliance, Risk factor reductions, Impressions, Counseling / Coordination of care, Documenting in the medical record, Reviewing / ordering tests, medicine, procedures  , and Obtaining or reviewing history  .     Patient Instructions   Patient Education     Good food sources of iron   The Basics   Written by the doctors and editors at [facility]   What is iron? -- Iron is a mineral that your body needs to make \"hemoglobin.\" Hemoglobin is a protein in the blood. It helps red blood cells carry oxygen to all parts of the body.  The amount of iron that you need in your diet depends on your age and sex. Your overall health also plays a role in how much iron you need each day.  Some people do not have enough iron. This is called \"iron deficiency.\" Getting plenty of iron through your diet can help prevent iron deficiency. But if you already have too little iron, eating foods with iron will not be enough to treat it. In this case, your doctor will prescribe extra iron to correct your levels.  What foods are good sources of iron? -- It depends on age:   Babies - It's important to make sure that babies get enough iron, especially if they drink breast milk.   Babies who drink breast milk need extra iron by the time they are 4 months old. This could come from an iron supplement, or solid foods that are high in iron (such as meats or iron-fortified baby cereal).   Babies who do not drink breast milk should drink an \"iron-fortified\" formula. (Formulas labeled \"low-iron\" will not provide enough iron.) Cow's milk and other types of milk do not have the right amount of iron or other nutrients for babies younger than 1 year old.   When a baby starts eating solid foods, include good sources " "of iron. Examples include meats or iron-fortified baby cereal.   Wait until your baby is at least 1 year old before switching from breast milk or formula to cow's milk or another type of milk.   Children and adults - Eating a healthy, balanced diet will give most people enough iron. Meat is a common source of iron. But if you don't eat meat, you should eat plenty of other foods that are rich in iron. Below are some examples of foods that are considered \"high\" or \"moderate\" in iron.   High-iron grains - Whole-wheat breads, cereals, and bagels. Flour tortillas, biscuits, English or bran muffins, iron-fortified bran, pretzels, frozen waffles. Hot cereals like oatmeal, cream of wheat, or grits.   Moderate-iron grains - Anca bread, egg noodles, whole-wheat pasta, hamburger and hot dog buns.   Moderate-iron fruits - Dried apricots, dried figs, prune juice.   High-iron vegetables - Spinach, soybeans, canned pumpkin.   Moderate-iron vegetables - Asparagus, [place] sprouts, mushrooms, green peas, white or sweet potato with the skin, tomato sauce, beets, beans such as garbanzo or lima. Greens such as mercedes, turnip, kale, beet, or Swiss chard.   High-iron meats and other proteins - Beef, veal, lamb, pork, beef or chicken liver, clams, sardines, oysters, shrimp, tofu, baked beans with pork, lentils, tahini, tempeh. Beans such as kidney, lima, navy, or white.   Moderate-iron meats and other proteins - Chicken breast, turkey, eggs, fresh or canned tuna or mackerel.   Other high-iron foods - Pumpkin seeds.   Other moderate-iron foods - Molasses, soy milk, seeds such as sesame or sunflower. Nuts such as almonds, pistachios, cashews, and walnuts.  What else should I know? -- Some medicines and foods can change how much iron you absorb from your food. Talk to your doctor, nurse, or dietitian if you have questions.  All topics are updated as new evidence becomes available and our peer review process is complete.  This topic " "retrieved from [employer] on: Mar 20, 2024.  Topic 859968 Version 2.0  Release: 32.2.4 - C32.78  © 2024 UpToDate, Inc. and/or its affiliates. All rights reserved.  Consumer Information Use and Disclaimer   Disclaimer: This generalized information is a limited summary of diagnosis, treatment, and/or medication information. It is not meant to be comprehensive and should be used as a tool to help the user understand and/or assess potential diagnostic and treatment options. It does NOT include all information about conditions, treatments, medications, side effects, or risks that may apply to a specific patient. It is not intended to be medical advice or a substitute for the medical advice, diagnosis, or treatment of a health care provider based on the health care provider's examination and assessment of a patient's specific and unique circumstances. Patients must speak with a health care provider for complete information about their health, medical questions, and treatment options, including any risks or benefits regarding use of medications. This information does not endorse any treatments or medications as safe, effective, or approved for treating a specific patient. UpToDate, Inc. and its affiliates disclaim any warranty or liability relating to this information or the use thereof.The use of this information is governed by the Terms of Use, available at https://www.Giftindia24x7.comtersCrowdStrikeuwer.com/en/know/clinical-effectiveness-terms. 2024© UpToDate, Inc. and its affiliates and/or licensors. All rights reserved.  Copyright   © 2024 UpToDate, Inc. and/or its affiliates. All rights reserved.  Patient Education     Routine physical for adults   The Basics   Written by the doctors and editors at [employer]   What is a physical? -- A physical is a routine visit, or \"check-up,\" with your doctor. You might also hear it called a \"wellness visit\" or \"preventive visit.\"  During each visit, the doctor will:   Ask about your physical and mental " "health   Ask about your habits, behaviors, and lifestyle   Do an exam   Give you vaccines if needed   Talk to you about any medicines you take   Give advice about your health   Answer your questions  Getting regular check-ups is an important part of taking care of your health. It can help your doctor find and treat any problems you have. But it's also important for preventing health problems.  A routine physical is different from a \"sick visit.\" A sick visit is when you see a doctor because of a health concern or problem. Since physicals are scheduled ahead of time, you can think about what you want to ask the doctor.  How often should I get a physical? -- It depends on your age and health. In general, for people age 21 years and older:   If you are younger than 50 years, you might be able to get a physical every 3 years.   If you are 50 years or older, your doctor might recommend a physical every year.  If you have an ongoing health condition, like diabetes or high blood pressure, your doctor will probably want to see you more often.  What happens during a physical? -- In general, each visit will include:   Physical exam - The doctor or nurse will check your height, weight, heart rate, and blood pressure. They will also look at your eyes and ears. They will ask about how you are feeling and whether you have any symptoms that bother you.   Medicines - It's a good idea to bring a list of all the medicines you take to each doctor visit. Your doctor will talk to you about your medicines and answer any questions. Tell them if you are having any side effects that bother you. You should also tell them if you are having trouble paying for any of your medicines.   Habits and behaviors - This includes:   Your diet   Your exercise habits   Whether you smoke, drink alcohol, or use drugs   Whether you are sexually active   Whether you feel safe at home  Your doctor will talk to you about things you can do to improve your health " "and lower your risk of health problems. They will also offer help and support. For example, if you want to quit smoking, they can give you advice and might prescribe medicines. If you want to improve your diet or get more physical activity, they can help you with this, too.   Lab tests, if needed - The tests you get will depend on your age and situation. For example, your doctor might want to check your:   Cholesterol   Blood sugar   Iron level   Vaccines - The recommended vaccines will depend on your age, health, and what vaccines you already had. Vaccines are very important because they can prevent certain serious or deadly infections.   Discussion of screening - \"Screening\" means checking for diseases or other health problems before they cause symptoms. Your doctor can recommend screening based on your age, risk, and preferences. This might include tests to check for:   Cancer, such as breast, prostate, cervical, ovarian, colorectal, prostate, lung, or skin cancer   Sexually transmitted infections, such as chlamydia and gonorrhea   Mental health conditions like depression and anxiety  Your doctor will talk to you about the different types of screening tests. They can help you decide which screenings to have. They can also explain what the results might mean.   Answering questions - The physical is a good time to ask the doctor or nurse questions about your health. If needed, they can refer you to other doctors or specialists, too.  Adults older than 65 years often need other care, too. As you get older, your doctor will talk to you about:   How to prevent falling at home   Hearing or vision tests   Memory testing   How to take your medicines safely   Making sure that you have the help and support you need at home  All topics are updated as new evidence becomes available and our peer review process is complete.  This topic retrieved from [employer] on: May 02, 2024.  Topic 737844 Version 1.0  Release: 32.4.3 - " C32.122  © 2024 [employer] and/or its affiliates. All rights reserved.  Consumer Information Use and Disclaimer   Disclaimer: This generalized information is a limited summary of diagnosis, treatment, and/or medication information. It is not meant to be comprehensive and should be used as a tool to help the user understand and/or assess potential diagnostic and treatment options. It does NOT include all information about conditions, treatments, medications, side effects, or risks that may apply to a specific patient. It is not intended to be medical advice or a substitute for the medical advice, diagnosis, or treatment of a health care provider based on the health care provider's examination and assessment of a patient's specific and unique circumstances. Patients must speak with a health care provider for complete information about their health, medical questions, and treatment options, including any risks or benefits regarding use of medications. This information does not endorse any treatments or medications as safe, effective, or approved for treating a specific patient. UpToDate, Inc. and its affiliates disclaim any warranty or liability relating to this information or the use thereof.The use of this information is governed by the Terms of Use, available at https://www.woltersMuseuwer.com/en/know/clinical-effectiveness-terms. 2024© UpToDate, Inc. and its affiliates and/or licensors. All rights reserved.  Copyright   © 2024 UpToDate, Inc. and/or its affiliates. All rights reserved.

## 2025-01-10 LAB — COLOGUARD RESULT REPORTABLE: NEGATIVE

## 2025-03-18 ENCOUNTER — CONSULT (OUTPATIENT)
Dept: OBGYN CLINIC | Facility: MEDICAL CENTER | Age: 50
End: 2025-03-18
Payer: MEDICARE

## 2025-03-18 VITALS
DIASTOLIC BLOOD PRESSURE: 70 MMHG | HEIGHT: 63 IN | BODY MASS INDEX: 26.75 KG/M2 | SYSTOLIC BLOOD PRESSURE: 118 MMHG | WEIGHT: 151 LBS

## 2025-03-18 DIAGNOSIS — D50.8 OTHER IRON DEFICIENCY ANEMIA: ICD-10-CM

## 2025-03-18 DIAGNOSIS — Z12.4 PAP SMEAR FOR CERVICAL CANCER SCREENING: ICD-10-CM

## 2025-03-18 DIAGNOSIS — N92.0 MENORRHAGIA WITH REGULAR CYCLE: Primary | ICD-10-CM

## 2025-03-18 PROCEDURE — 99203 OFFICE O/P NEW LOW 30 MIN: CPT | Performed by: OBSTETRICS & GYNECOLOGY

## 2025-03-18 RX ORDER — TRANEXAMIC ACID 650 MG/1
1300 TABLET ORAL 3 TIMES DAILY
Qty: 30 TABLET | Refills: 3 | Status: SHIPPED | OUTPATIENT
Start: 2025-03-18

## 2025-03-18 NOTE — PROGRESS NOTES
Name: Rafy Khan      : 1975      MRN: [id number]  Encounter Provider: Marsha Eason MD  Encounter Date: 3/18/2025   Enc[facility]: [facility]  :  Assessment & Plan  Pap smear for cervical cancer screening    Orders:    Ambulatory Referral to Obstetrics / Gynecology  will return for  yearly  exam and EMB   Menorrhagia with regular cycle    Orders:    US pelvis complete w transvaginal; Future    Tranexamic Acid 650 MG TABS; Take 2 tablets (1,300 mg total) by mouth 3 (three) times a day  we dsicussed  possible causes of  heavy cycles  from adenomyosis  / fibroids / polyps / infection   We discussed management  options from medical to surgical    Aware recommend further work up with US and  EMB to better  assess cause and  be able to guide management    Other iron deficiency anemia       previous blood transfusion       History of Present Illness   HPI  Rafy Khan is a 49 y.o. female who presents for consultation  She is   a   with  2 prior  sections and permanent  sterilization. States she has always had extremely heavy cycles but not needs to wear diapers with her cycle and can't leave the house  when on  menses  . She has noticed this change for  past  couple of  years  . She did need a  blood transfusion some years ago because of  heavy cycle. She has been on  birth control  in  past but currently  not on  medication for  cycle  control         Review of Systems   Constitutional:  Positive for unexpected weight change.   HENT: Negative.     Respiratory: Negative.     Cardiovascular:  Negative for palpitations.   Gastrointestinal: Negative.    Endocrine: Negative.    Genitourinary:  Positive for menstrual problem and pelvic pain. Negative for decreased urine volume, difficulty urinating, dyspareunia, dysuria, enuresis, flank pain, frequency, genital sores, hematuria, urgency, vaginal bleeding, vaginal discharge and vaginal  "pain.   Musculoskeletal: Negative.    Skin: Negative.    Neurological: Negative.    Psychiatric/Behavioral: Negative.            Objective   /70   Ht 5' 3\" (1.6 m)   Wt 68.5 kg (151 lb)   LMP 03/03/2025 (Exact Date)   BMI 26.75 kg/m²      Physical Exam  Vitals reviewed.   Constitutional:       Appearance: Normal appearance.   HENT:      Head: Normocephalic and atraumatic.      Nose: Nose normal.   Eyes:      Conjunctiva/sclera: Conjunctivae normal.   Pulmonary:      Effort: Pulmonary effort is normal.   Abdominal:      General: There is no distension.      Palpations: Abdomen is soft. There is no mass.      Tenderness: There is no abdominal tenderness. There is no guarding or rebound.      Hernia: No hernia is present.   Genitourinary:     Comments: Deferred as she will return for  annual and  EMB   Neurological:      Mental Status: She is alert.           "

## 2025-03-18 NOTE — ASSESSMENT & PLAN NOTE
Orders:    US pelvis complete w transvaginal; Future    Tranexamic Acid 650 MG TABS; Take 2 tablets (1,300 mg total) by mouth 3 (three) times a day  we dsicussed  possible causes of  heavy cycles  from adenomyosis  / fibroids / polyps / infection   We discussed management  options from medical to surgical    Aware recommend further work up with US and  EMB to better  assess cause and  be able to guide management     89.4

## 2025-04-11 ENCOUNTER — HOSPITAL ENCOUNTER (OUTPATIENT)
Dept: ULTRASOUND IMAGING | Facility: MEDICAL CENTER | Age: 50
Discharge: HOME | End: 2025-04-11
Payer: MEDICARE

## 2025-04-11 DIAGNOSIS — N92.0 MENORRHAGIA WITH REGULAR CYCLE: ICD-10-CM

## 2025-04-11 PROCEDURE — 76830 TRANSVAGINAL US NON-OB: CPT

## 2025-04-11 PROCEDURE — 76856 US EXAM PELVIC COMPLETE: CPT

## 2025-04-15 ENCOUNTER — RESULTS FOLLOW-UP (OUTPATIENT)
Dept: OBGYN CLINIC | Facility: MEDICAL CENTER | Age: 50
End: 2025-04-15

## 2025-04-15 NOTE — RESULT ENCOUNTER NOTE
Please inform patient I reviewed her US showing  a  uterine  fibroid   She has a follow  up with me scheduled at  which time we will discuss thanks

## 2025-05-30 ENCOUNTER — ANNUAL EXAM (OUTPATIENT)
Age: 50
End: 2025-05-30

## 2025-05-30 VITALS
DIASTOLIC BLOOD PRESSURE: 78 MMHG | HEIGHT: 63 IN | WEIGHT: 153 LBS | BODY MASS INDEX: 27.11 KG/M2 | SYSTOLIC BLOOD PRESSURE: 120 MMHG

## 2025-05-30 DIAGNOSIS — D25.9 UTERINE LEIOMYOMA, UNSPECIFIED LOCATION: ICD-10-CM

## 2025-05-30 DIAGNOSIS — D50.9 IRON DEFICIENCY ANEMIA, UNSPECIFIED IRON DEFICIENCY ANEMIA TYPE: ICD-10-CM

## 2025-05-30 DIAGNOSIS — N92.0 MENORRHAGIA WITH REGULAR CYCLE: ICD-10-CM

## 2025-05-30 DIAGNOSIS — D50.8 OTHER IRON DEFICIENCY ANEMIA: ICD-10-CM

## 2025-05-30 DIAGNOSIS — Z12.4 SCREENING FOR CERVICAL CANCER: ICD-10-CM

## 2025-05-30 DIAGNOSIS — Z01.419 ENCOUNTER FOR GYNECOLOGICAL EXAMINATION: Primary | ICD-10-CM

## 2025-05-30 PROCEDURE — G0476 HPV COMBO ASSAY CA SCREEN: HCPCS | Performed by: OBSTETRICS & GYNECOLOGY

## 2025-05-30 PROCEDURE — 88305 TISSUE EXAM BY PATHOLOGIST: CPT | Performed by: PATHOLOGY

## 2025-05-30 PROCEDURE — G0143 SCR C/V CYTO,THINLAYER,RESCR: HCPCS | Performed by: STUDENT IN AN ORGANIZED HEALTH CARE EDUCATION/TRAINING PROGRAM

## 2025-05-30 RX ORDER — FERROUS SULFATE 324(65)MG
324 TABLET, DELAYED RELEASE (ENTERIC COATED) ORAL EVERY OTHER DAY
Qty: 50 TABLET | Refills: 2 | Status: SHIPPED | OUTPATIENT
Start: 2025-05-30 | End: 2026-03-26

## 2025-05-30 RX ORDER — TRANEXAMIC ACID 650 MG/1
1300 TABLET ORAL 3 TIMES DAILY
Qty: 30 TABLET | Refills: 3 | Status: SHIPPED | OUTPATIENT
Start: 2025-05-30

## 2025-05-30 NOTE — PROGRESS NOTES
"Name: Rafy Khan      : 1975      MRN: [id number]  Encounter Provider: Marsha Eason MD  Encounter Date: 2025   Encounter department: [facility]  :  Assessment & Plan  Screening for cervical cancer    Orders:  •  Liquid-based pap, screening    Menorrhagia with regular cycle    Orders:  •  Tissue Exam  •  Tranexamic Acid 650 MG TABS; Take 2 tablets (1,300 mg total) by mouth 3 (three) times a day  •  Endometrial biopsy    Other iron deficiency anemia         Iron deficiency anemia, unspecified iron deficiency anemia type    Orders:  •  ferrous sulfate 324 (65 Fe) mg; Take 1 tablet (324 mg total) by mouth every other day    Encounter for gynecological examination         Uterine leiomyoma, unspecified location       we reviewed US showing  5 cm fibroids        History of Present Illness   HPI  Rafy Khan is a 49 y.o. female who presents for annual GYN exam and EMB secondary to very heavy cycles and  pelvic pain   History obtained from: patient    Review of Systems   Constitutional: Negative.    HENT: Negative.     Respiratory: Negative.     Cardiovascular: Negative.    Gastrointestinal: Negative.    Genitourinary:  Positive for menstrual problem and pelvic pain.   Neurological: Negative.    Psychiatric/Behavioral: Negative.          Objective   /78   Ht 5' 3\" (1.6 m)   Wt 69.4 kg (153 lb)   LMP 2025 (Exact Date)   BMI 27.10 kg/m²      Physical Exam  Constitutional:       Appearance: Normal appearance. She is normal weight.   HENT:      Head: Normocephalic and atraumatic.      Nose: Nose normal.     Eyes:      Conjunctiva/sclera: Conjunctivae normal.     Pulmonary:      Effort: Pulmonary effort is normal.   Chest:   Breasts:     Right: Normal. No swelling, bleeding, inverted nipple, mass, nipple discharge, skin change or tenderness.      Left: Normal. No swelling, bleeding, inverted nipple, mass, nipple " discharge, skin change or tenderness.   Abdominal:      General: There is no distension.      Palpations: There is no mass.      Tenderness: There is no abdominal tenderness. There is no guarding or rebound.      Hernia: No hernia is present.   Genitourinary:     General: Normal vulva.      Vagina: Normal.      Cervix: Normal.      Uterus: Normal.       Adnexa: Right adnexa normal and left adnexa normal.      Comments: Irregular shape of  uterus ,  palpable fullness on  posterior  cul  de sac      Neurological:      General: No focal deficit present.      Mental Status: She is alert and oriented to person, place, and time.     Psychiatric:         Mood and Affect: Mood normal.         Behavior: Behavior normal.

## 2025-05-30 NOTE — ASSESSMENT & PLAN NOTE
Orders:    Tissue Exam    Tranexamic Acid 650 MG TABS; Take 2 tablets (1,300 mg total) by mouth 3 (three) times a day    Endometrial biopsy

## 2025-05-30 NOTE — PROGRESS NOTES
"Endometrial biopsy    Date/Time: 5/30/2025 1:00 PM    Performed by: Marsha Eason MD  Authorized by: Marsha Eason MD    [place] Protocol:  procedure performed by consultantConsent: Verbal consent obtained. Written consent obtained  Risks and benefits: risks, benefits and alternatives were discussed  Consent given by: patient  Time out: Immediately prior to procedure a \"time out\" was called to verify the correct patient, procedure, equipment, support staff and site/side marked as required.  Patient understanding: patient states understanding of the procedure being performed  Patient consent: the patient's understanding of the procedure matches consent given  Procedure consent: procedure consent matches procedure scheduled  Patient identity confirmed: verbally with patient    Indication:     Indications: Other disorder of menstruation and other abnormal bleeding from female genital tract    Pre-procedure:     Negative urine pregnancy test: no (tubal)    Procedure:     Procedure: endometrial biopsy with Pipelle      A bivalve speculum was placed in the vagina: yes      Cervix cleaned and prepped: yes      The cervix was dilated: no      Uterus sounded: yes      Uterus sound depth (cm):  10    Curettes used:  1    Specimen collected: specimen collected and sent to pathology      Patient tolerated procedure well with no complications: yes    Findings:     Uterus size:  Non-gravid    Cervix: normal      Adnexa: normal      "

## 2025-06-02 LAB
HPV HR 12 DNA CVX QL NAA+PROBE: NEGATIVE
HPV16 DNA CVX QL NAA+PROBE: NEGATIVE
HPV18 DNA CVX QL NAA+PROBE: NEGATIVE

## 2025-06-04 PROCEDURE — 88305 TISSUE EXAM BY PATHOLOGIST: CPT | Performed by: PATHOLOGY

## 2025-06-11 ENCOUNTER — RESULTS FOLLOW-UP (OUTPATIENT)
Dept: LABOR AND DELIVERY | Facility: HOSPITAL | Age: 50
End: 2025-06-11

## 2025-06-11 LAB
LAB AP GYN PRIMARY INTERPRETATION: NORMAL
Lab: NORMAL
PATH INTERP SPEC-IMP: NORMAL

## 2025-06-20 ENCOUNTER — OFFICE VISIT (OUTPATIENT)
Age: 50
End: 2025-06-20
Payer: MEDICARE

## 2025-06-20 VITALS
DIASTOLIC BLOOD PRESSURE: 60 MMHG | BODY MASS INDEX: 26.58 KG/M2 | HEIGHT: 63 IN | WEIGHT: 150 LBS | SYSTOLIC BLOOD PRESSURE: 118 MMHG

## 2025-06-20 DIAGNOSIS — N92.0 MENORRHAGIA WITH REGULAR CYCLE: Primary | ICD-10-CM

## 2025-06-20 DIAGNOSIS — Z30.42 ENCOUNTER FOR DEPO-PROVERA CONTRACEPTION: ICD-10-CM

## 2025-06-20 DIAGNOSIS — D25.9 UTERINE LEIOMYOMA, UNSPECIFIED LOCATION: ICD-10-CM

## 2025-06-20 PROCEDURE — 99213 OFFICE O/P EST LOW 20 MIN: CPT | Performed by: OBSTETRICS & GYNECOLOGY

## 2025-06-20 PROCEDURE — 96372 THER/PROPH/DIAG INJ SC/IM: CPT | Performed by: OBSTETRICS & GYNECOLOGY

## 2025-06-20 RX ORDER — MEDROXYPROGESTERONE ACETATE 150 MG/ML
150 INJECTION, SUSPENSION INTRAMUSCULAR
Qty: 1 ML | Refills: 2 | Status: SHIPPED | OUTPATIENT
Start: 2025-06-20

## 2025-06-20 RX ORDER — MEDROXYPROGESTERONE ACETATE 150 MG/ML
150 INJECTION, SUSPENSION INTRAMUSCULAR ONCE
Status: COMPLETED | OUTPATIENT
Start: 2025-06-20 | End: 2025-06-20

## 2025-06-20 RX ADMIN — MEDROXYPROGESTERONE ACETATE 150 MG: 150 INJECTION, SUSPENSION INTRAMUSCULAR at 14:53

## 2025-06-20 NOTE — PROGRESS NOTES
Name: Rafy Khan      : 1975      MRN: [id number]  Encounter Provider: Marsha Eason MD  Encounter Date: 2025   Encounter department: [facility]  :  Assessment & Plan  Menorrhagia with regular cycle    Orders:    medroxyPROGESTERone (DEPO-PROVERA) 150 mg/mL injection; Inject 1 mL (150 mg total) into a muscle every 3 (three) months    Uterine leiomyoma, unspecified location    Orders:    medroxyPROGESTERone (DEPO-PROVERA) 150 mg/mL injection; Inject 1 mL (150 mg total) into a muscle every 3 (three) months      Today  we reviewed her  US and  EMB results  as well as management  options . We discussed medical management  vs  interventional  radiology for  artery embolization vs  hysterectomy    Risks and benefits reviewed , interested in trial of  Depo  but desires to schedule surgical consultation  because it this does not  work desires to proceed with hysterectomy   UTERUS:  The uterus is anteverted in position, measuring 9.2 x 6.3 x 7.8 cm.  The uterus has a normal contour and echotexture. Fibroid measuring 5.0 x 5.0 x 4.4 cm  The cervix appears within normal limits.     ENDOMETRIUM:  The endometrial echo complex has an AP caliber of 15.0 mm.  Appearance within normal limits.     OVARIES/ADNEXA:  Right ovary: 2.5 x 2.4 x 1.4 cm. 4.1 mL.  Ovarian Doppler flow is within normal limits.  No suspicious ovarian or adnexal abnormality.     Left ovary: 2.7 x 2.8 x 1.9 cm. 7.8 mL.  Ovarian Doppler flow is within normal limits.  No suspicious ovarian or adnexal abnormality.     OTHER:  No free fluid or loculated fluid collections.     IMPRESSION:     Uterine fibroid measuring 5.0 x 5.0 x 4.4 cm.     A. Endometrium, EMB:  Secretory endometrium  No atypia or malignancy identified       History of Present Illness   HPI  Rafy Khan is a 49 y.o. female who presents for follow  up from EMB and pelvic US   States she has  been using   "TXA but  notices she still has  heavy  cycles where she fills pad  in 15 min . She had tried  depo  some years ago  and  had  no cycle with this and  wonders if this is  an option. States if  this would not  help then would  be  interested in surgical  approach  History obtained from: patient    Review of Systems   Constitutional: Negative.    HENT: Negative.     Respiratory: Negative.     Cardiovascular: Negative.    Gastrointestinal: Negative.    Genitourinary:  Positive for menstrual problem and pelvic pain.   Musculoskeletal: Negative.    Psychiatric/Behavioral: Negative.            Objective   /60   Ht 5' 3\" (1.6 m)   Wt 68 kg (150 lb)   LMP 06/03/2025 (Exact Date)   BMI 26.57 kg/m²      Physical Exam  Vitals reviewed.   Constitutional:       Appearance: Normal appearance.   HENT:      Head: Normocephalic and atraumatic.      Nose: Nose normal.     Eyes:      Conjunctiva/sclera: Conjunctivae normal.     Pulmonary:      Effort: Pulmonary effort is normal.     Neurological:      General: No focal deficit present.      Mental Status: She is alert and oriented to person, place, and time.     Psychiatric:         Mood and Affect: Mood normal.         Behavior: Behavior normal.           "

## 2025-06-20 NOTE — PROGRESS NOTES
Patient presents for Depo-Provera injection.  Patient supplied medication.  Patient tolerated IM injection right deltoid.  Denied questions or concerns at conclusion of conversation.  Aware repeat injection is due in 3 months.  Encouraged to schedule prior to leaving office at today's visit.  Lot # [id number] [id number] Expiration date 2027/04

## 2025-06-20 NOTE — ASSESSMENT & PLAN NOTE
Orders:    medroxyPROGESTERone (DEPO-PROVERA) 150 mg/mL injection; Inject 1 mL (150 mg total) into a muscle every 3 (three) months      Today  we reviewed her  US and  EMB results  as well as management  options . We discussed medical management  vs  interventional  radiology for  artery embolization vs  hysterectomy    Risks and benefits reviewed , interested in trial of  Depo  but desires to schedule surgical consultation  because it this does not  work desires to proceed with hysterectomy

## 2025-06-20 NOTE — ASSESSMENT & PLAN NOTE
Orders:    medroxyPROGESTERone (DEPO-PROVERA) 150 mg/mL injection; Inject 1 mL (150 mg total) into a muscle every 3 (three) months